# Patient Record
Sex: MALE | URBAN - METROPOLITAN AREA
[De-identification: names, ages, dates, MRNs, and addresses within clinical notes are randomized per-mention and may not be internally consistent; named-entity substitution may affect disease eponyms.]

---

## 2022-01-24 ENCOUNTER — APPOINTMENT (RX ONLY)
Dept: URBAN - METROPOLITAN AREA CLINIC 150 | Facility: CLINIC | Age: 87
Setting detail: DERMATOLOGY
End: 2022-01-24

## 2022-01-24 DIAGNOSIS — L85.3 XEROSIS CUTIS: ICD-10-CM

## 2022-01-24 DIAGNOSIS — L29.89 OTHER PRURITUS: ICD-10-CM

## 2022-01-24 DIAGNOSIS — L29.8 OTHER PRURITUS: ICD-10-CM

## 2022-01-24 DIAGNOSIS — T50.905 ADVERSE EFFECT OF UNSPECIFIED DRUGS, MEDICAMENTS AND BIOLOGICAL SUBSTANCES: ICD-10-CM

## 2022-01-24 PROBLEM — T50.905A ADVERSE EFFECT OF UNSPECIFIED DRUGS, MEDICAMENTS AND BIOLOGICAL SUBSTANCES, INITIAL ENCOUNTER: Status: ACTIVE | Noted: 2022-01-24

## 2022-01-24 PROCEDURE — ? COUNSELING

## 2022-01-24 PROCEDURE — 99203 OFFICE O/P NEW LOW 30 MIN: CPT

## 2022-01-24 ASSESSMENT — LOCATION SIMPLE DESCRIPTION DERM
LOCATION SIMPLE: CHEST
LOCATION SIMPLE: RIGHT UPPER BACK

## 2022-01-24 ASSESSMENT — LOCATION DETAILED DESCRIPTION DERM
LOCATION DETAILED: RIGHT SUPERIOR UPPER BACK
LOCATION DETAILED: RIGHT MID-UPPER BACK
LOCATION DETAILED: STERNUM

## 2022-01-24 ASSESSMENT — LOCATION ZONE DERM: LOCATION ZONE: TRUNK

## 2022-01-24 NOTE — PROCEDURE: COUNSELING
Detail Level: Generalized
Patient Specific Counseling (Will Not Stick From Patient To Patient): .\\n1/24/22\\nWill follow up in 3 months to re-evaluate and do biopsy if rash present. Ethacrinic acid would be a recommendation if he doesn’t go back in the Lasix medication, he should avoid sulfa drugs if this was a possibly drug reaction. Denies feeling itchy today.m\\nThere is nothing present today upon exam. He declines exam in groin area. My medical assistant was a the .
Detail Level: Detailed
Patient Specific Counseling (Will Not Stick From Patient To Patient): 1/24/22\\nSuggested he not take Lasix again as this could be the possible cause of rash.

## 2022-06-01 ENCOUNTER — APPOINTMENT (RX ONLY)
Dept: URBAN - METROPOLITAN AREA CLINIC 150 | Facility: CLINIC | Age: 87
Setting detail: DERMATOLOGY
End: 2022-06-01

## 2022-06-01 DIAGNOSIS — T50.905 ADVERSE EFFECT OF UNSPECIFIED DRUGS, MEDICAMENTS AND BIOLOGICAL SUBSTANCES: ICD-10-CM | Status: RESOLVING

## 2022-06-01 DIAGNOSIS — L85.3 XEROSIS CUTIS: ICD-10-CM

## 2022-06-01 DIAGNOSIS — L29.89 OTHER PRURITUS: ICD-10-CM

## 2022-06-01 DIAGNOSIS — L29.8 OTHER PRURITUS: ICD-10-CM

## 2022-06-01 PROBLEM — T50.905A ADVERSE EFFECT OF UNSPECIFIED DRUGS, MEDICAMENTS AND BIOLOGICAL SUBSTANCES, INITIAL ENCOUNTER: Status: ACTIVE | Noted: 2022-06-01

## 2022-06-01 PROCEDURE — ? COUNSELING

## 2022-06-01 PROCEDURE — 99213 OFFICE O/P EST LOW 20 MIN: CPT

## 2022-06-01 ASSESSMENT — LOCATION DETAILED DESCRIPTION DERM
LOCATION DETAILED: RIGHT MID-UPPER BACK
LOCATION DETAILED: RIGHT SUPERIOR UPPER BACK
LOCATION DETAILED: STERNUM

## 2022-06-01 ASSESSMENT — LOCATION SIMPLE DESCRIPTION DERM
LOCATION SIMPLE: CHEST
LOCATION SIMPLE: RIGHT UPPER BACK

## 2022-06-01 ASSESSMENT — LOCATION ZONE DERM: LOCATION ZONE: TRUNK

## 2022-06-01 NOTE — PROCEDURE: COUNSELING
Patient Specific Counseling (Will Not Stick From Patient To Patient): 1/24/22\\nSuggested he not take Lasix again as this could be the possible cause of rash.
Detail Level: Detailed
Detail Level: Generalized
Patient Specific Counseling (Will Not Stick From Patient To Patient): 6/1/22\\nSuggested steroid creams as needed and moisturizing creams such as CeraVe or Vaseline.
Patient Specific Counseling (Will Not Stick From Patient To Patient): .\\n6/1/22\\nThe patient has improved but he still has itching. The patient is at a nursing home in [place]. He is on hydrocortisone as needed and flucinonide ointment BID. He discontinued lasik medications.  Continue with the current creams. He is having improvement with his current regimen. \\n\\nSaramukesh Hogan NP was able to speak with [facility] CNA in regards to creams and other moisturizing agents such as CeraVe cream or Vaseline/Aquaphor. Suggested the [facility] md call our office if any further questions arise. \\n.\\n1/24/22\\nWill follow up in 3 months to re-evaluate and do biopsy if rash present. Ethacrinic acid would be a recommendation if he doesn’t go back in the Lasix medication, he should avoid sulfa drugs if this was a possibly drug reaction. Denies feeling itchy today.m\\nThere is nothing present today upon exam. He declines exam in groin area. My medical assistant was a the .